# Patient Record
Sex: FEMALE | Race: WHITE | NOT HISPANIC OR LATINO
[De-identification: names, ages, dates, MRNs, and addresses within clinical notes are randomized per-mention and may not be internally consistent; named-entity substitution may affect disease eponyms.]

---

## 2017-01-05 ENCOUNTER — CHARTING TRANS (OUTPATIENT)
Dept: OTHER | Age: 72
End: 2017-01-05

## 2018-04-25 PROBLEM — K59.00 CONSTIPATION: Status: ACTIVE | Noted: 2017-09-11

## 2018-04-25 PROBLEM — K27.9 PUD (PEPTIC ULCER DISEASE): Status: ACTIVE | Noted: 2017-09-11

## 2018-04-25 PROBLEM — R10.12 LUQ ABDOMINAL PAIN: Status: ACTIVE | Noted: 2017-12-11

## 2018-04-25 PROBLEM — H25.811 COMBINED FORMS OF AGE-RELATED CATARACT, RIGHT EYE: Status: ACTIVE | Noted: 2017-06-19

## 2018-04-25 PROBLEM — K85.90 ACUTE PANCREATITIS (HCC): Status: ACTIVE | Noted: 2017-09-11

## 2018-04-25 PROBLEM — R07.89 ATYPICAL CHEST PAIN: Status: ACTIVE | Noted: 2017-09-11

## 2018-04-25 PROBLEM — K85.90 ACUTE PANCREATITIS: Status: ACTIVE | Noted: 2017-09-11

## 2018-04-25 PROBLEM — R10.13 EPIGASTRIC ABDOMINAL PAIN: Status: ACTIVE | Noted: 2017-09-11

## 2018-04-25 PROBLEM — H04.123 DRY EYE SYNDROME OF BILATERAL LACRIMAL GLANDS: Status: ACTIVE | Noted: 2017-06-19

## 2018-04-25 PROBLEM — K29.70 HELICOBACTER POSITIVE GASTRITIS: Status: ACTIVE | Noted: 2017-09-11

## 2018-04-25 PROBLEM — H25.10 NUCLEAR SENILE CATARACT: Status: ACTIVE | Noted: 2017-06-19

## 2018-04-25 PROBLEM — S05.00XA ABRASION OF CONJUNCTIVA: Status: ACTIVE | Noted: 2017-01-31

## 2018-04-25 PROBLEM — B96.81 HELICOBACTER POSITIVE GASTRITIS: Status: ACTIVE | Noted: 2017-09-11

## 2018-09-25 PROBLEM — H18.529 ABMD (ANTERIOR BASEMENT MEMBRANE DYSTROPHY): Status: ACTIVE | Noted: 2018-09-25

## 2018-09-25 PROBLEM — H25.13 NUCLEAR SCLEROTIC CATARACT OF BOTH EYES: Status: ACTIVE | Noted: 2018-09-25

## 2018-11-05 VITALS
HEART RATE: 78 BPM | HEIGHT: 60 IN | BODY MASS INDEX: 26.5 KG/M2 | OXYGEN SATURATION: 100 % | DIASTOLIC BLOOD PRESSURE: 82 MMHG | WEIGHT: 134.99 LBS | SYSTOLIC BLOOD PRESSURE: 152 MMHG

## 2019-10-03 PROBLEM — H43.812 POSTERIOR VITREOUS DETACHMENT OF LEFT EYE: Status: ACTIVE | Noted: 2019-10-03

## 2021-09-17 PROBLEM — H91.90 DECREASED HEARING: Status: ACTIVE | Noted: 2021-09-17

## 2021-09-17 PROBLEM — H90.2 CONDUCTIVE HEARING LOSS: Status: ACTIVE | Noted: 2021-09-17

## 2021-09-17 PROBLEM — E05.00 GRAVES' DISEASE: Status: ACTIVE | Noted: 2021-09-17

## 2021-09-17 PROBLEM — R09.89 CAROTID BRUIT: Status: ACTIVE | Noted: 2021-09-17

## 2021-09-17 PROBLEM — M54.9 BACKACHE: Status: ACTIVE | Noted: 2021-09-17

## 2021-09-17 PROBLEM — M54.12 CERVICAL RADICULOPATHY: Status: ACTIVE | Noted: 2021-09-17

## 2021-09-17 PROBLEM — M81.0 OSTEOPOROSIS: Status: ACTIVE | Noted: 2021-09-17

## 2021-09-17 PROBLEM — L30.9 DERMATITIS: Status: ACTIVE | Noted: 2021-09-17

## 2021-09-17 PROBLEM — R42 VERTIGO: Status: ACTIVE | Noted: 2021-09-17

## 2021-09-17 PROBLEM — M79.2 NEURALGIA: Status: ACTIVE | Noted: 2021-09-17

## 2021-09-17 PROBLEM — R63.8 INCREASED BODY MASS INDEX: Status: ACTIVE | Noted: 2018-09-20

## 2021-09-17 PROBLEM — M19.90 ARTHRITIS: Status: ACTIVE | Noted: 2021-09-17

## 2021-09-17 PROBLEM — M54.9 CHRONIC BACK PAIN: Status: ACTIVE | Noted: 2021-09-17

## 2021-09-17 PROBLEM — M41.9 ACQUIRED SCOLIOSIS: Status: ACTIVE | Noted: 2021-09-17

## 2021-09-17 PROBLEM — J32.9 SINUSITIS: Status: ACTIVE | Noted: 2021-09-17

## 2021-09-17 PROBLEM — M54.50 LOW BACK PAIN: Status: ACTIVE | Noted: 2021-09-17

## 2021-09-17 PROBLEM — M35.00 SJOGREN'S SYNDROME (HCC): Status: ACTIVE | Noted: 2018-03-12

## 2021-09-17 PROBLEM — IMO0002 EXCESS OR DEFICIENCY OF VITAMIN D: Status: ACTIVE | Noted: 2021-09-17

## 2021-09-17 PROBLEM — M85.80 OSTEOPENIA: Status: ACTIVE | Noted: 2021-09-17

## 2021-09-17 PROBLEM — H61.90 LESION OF EXTERNAL EAR: Status: ACTIVE | Noted: 2021-09-17

## 2021-09-17 PROBLEM — E03.9 HYPOTHYROIDISM: Status: ACTIVE | Noted: 2021-09-17

## 2021-09-17 PROBLEM — M48.00 SPINAL STENOSIS: Status: ACTIVE | Noted: 2021-09-17

## 2021-09-17 PROBLEM — M54.16 LUMBAR RADICULOPATHY: Status: ACTIVE | Noted: 2021-09-17

## 2021-09-17 PROBLEM — E78.5 HYPERLIPIDEMIA: Status: ACTIVE | Noted: 2021-09-17

## 2021-09-17 PROBLEM — M25.559 HIP PAIN: Status: ACTIVE | Noted: 2021-09-17

## 2021-09-17 PROBLEM — J06.9 UPPER RESPIRATORY INFECTION: Status: ACTIVE | Noted: 2021-09-17

## 2021-09-17 PROBLEM — E89.0 POSTABLATIVE HYPOTHYROIDISM: Status: ACTIVE | Noted: 2021-09-17

## 2021-09-17 PROBLEM — IMO0002 MASS: Status: ACTIVE | Noted: 2021-09-17

## 2021-09-17 PROBLEM — G89.29 CHRONIC BACK PAIN: Status: ACTIVE | Noted: 2021-09-17

## 2024-03-21 PROBLEM — J06.9 UPPER RESPIRATORY INFECTION: Status: RESOLVED | Noted: 2021-09-17 | Resolved: 2024-03-21

## 2024-08-29 ENCOUNTER — OFFICE VISIT (OUTPATIENT)
Dept: OTOLARYNGOLOGY | Facility: CLINIC | Age: 79
End: 2024-08-29

## 2024-08-29 DIAGNOSIS — M26.609 TMJ (TEMPOROMANDIBULAR JOINT DISORDER): ICD-10-CM

## 2024-08-29 DIAGNOSIS — H61.23 BILATERAL IMPACTED CERUMEN: Primary | ICD-10-CM

## 2024-08-29 DIAGNOSIS — H92.02 LEFT EAR PAIN: ICD-10-CM

## 2024-08-29 DIAGNOSIS — M35.00 SJOGREN'S SYNDROME, WITH UNSPECIFIED ORGAN INVOLVEMENT (HCC): ICD-10-CM

## 2024-08-29 PROCEDURE — 1159F MED LIST DOCD IN RCRD: CPT | Performed by: STUDENT IN AN ORGANIZED HEALTH CARE EDUCATION/TRAINING PROGRAM

## 2024-08-29 PROCEDURE — 99203 OFFICE O/P NEW LOW 30 MIN: CPT | Performed by: STUDENT IN AN ORGANIZED HEALTH CARE EDUCATION/TRAINING PROGRAM

## 2024-08-29 PROCEDURE — 1160F RVW MEDS BY RX/DR IN RCRD: CPT | Performed by: STUDENT IN AN ORGANIZED HEALTH CARE EDUCATION/TRAINING PROGRAM

## 2024-08-29 PROCEDURE — 69210 REMOVE IMPACTED EAR WAX UNI: CPT | Performed by: STUDENT IN AN ORGANIZED HEALTH CARE EDUCATION/TRAINING PROGRAM

## 2024-08-29 RX ORDER — TRAZODONE HYDROCHLORIDE 100 MG/1
100 TABLET ORAL NIGHTLY
COMMUNITY
Start: 2024-04-21

## 2024-08-29 RX ORDER — ROSUVASTATIN CALCIUM 20 MG/1
1 TABLET, COATED ORAL DAILY
COMMUNITY
Start: 2024-04-29

## 2024-08-29 NOTE — PROGRESS NOTES
Shwetha Pedroza is a 79 year old female.   Chief Complaint   Patient presents with    Ear Problem     Sharp pain and fuzzy feeling in left ear  Pt reports pain just started in right ear     HPI:   79-year-old with left ear pain.  Has a history of Sjogren's syndrome.  Also reports the pain recently started in her right ear    Current Outpatient Medications   Medication Sig Dispense Refill    traZODone 100 MG Oral Tab Take 1 tablet (100 mg total) by mouth nightly. TAKE AT BEDTIME      rosuvastatin 20 MG Oral Tab Take 1 tablet (20 mg total) by mouth daily.      denosumab (PROLIA) 60 MG/ML Subcutaneous Solution 1 mL.      Carboxymethylcellulose Sodium (REFRESH OP) Apply to eye.      aspirin 81 MG Oral Tab EC daily.      famotidine 40 MG Oral Tab Take by mouth daily.      Levothyroxine Sodium 100 MCG Oral Tab daily.      Esomeprazole Magnesium 40 MG Oral Capsule Delayed Release Take by mouth daily.      Calcium Carbonate-Vitamin D 500-200 MG-UNIT Oral Tab Take by mouth daily.        Past Medical History:    ABMD (anterior basement membrane dystrophy)    Age-related nuclear cataract, left eye    Bilateral dry eyes    Combined form of age-related cataract, right eye    Meibomianitis    Osteoporosis    Pancreatitis (HCC)    Puckering of macula, right eye    Thyroid disease    Vitreous degeneration, left eye      Social History:  Social History     Socioeconomic History    Marital status: Unknown   Tobacco Use    Smoking status: Never    Smokeless tobacco: Never   Substance and Sexual Activity    Alcohol use: No    Drug use: No      Past Surgical History:   Procedure Laterality Date    Cholecystectomy  2007    Colonoscopy  09/24/2010    Egd  10/05/2017    Egd  02/2018         EXAM:   There were no vitals taken for this visit.    System Details   Skin Inspection - Normal.   Constitutional Overall appearance - Normal.   Head/Face Symmetric, TMJ tenderness not present   Tender palpable left small lump near the region of her left  parotid gland   Eyes EOMI, PERRL   Right ear:  Canal clear, TM intact, no HA   Left ear:  Canal clear, TM intact, no HA   Nose: Septum midline, inferior turbinates not enlarged, nasal valves without collapse    Oral cavity/Oropharynx: No lesions or masses on inspection or palpation, tonsils symmetric    Neck: Soft without LAD, thyroid not enlarged  Voice clear/ no stridor   Other:      SCOPES AND PROCEDURES:     Canals:  Left: Canal with cerumen preventing adequate view of TM, debrided with instrumentation  Right: Canal with cerumen preventing adequate view of TM, debrided with instrumentation    Tympanic Membranes:  Left: Normal tympanic membrane.   Right: Normal tympanic membrane.     TM Visualized Method:   Left TM examined via otomicroscopy.    Right TM examined via otomicroscopy.      PROCEDURE:   Removal of cerumen impaction   The cerumen impaction was completely removed on the left and right sides using microscopy as necessary.   Removal was completed by using a curette and suction.     AUDIOGRAM AND IMAGING:         IMPRESSION:   1. Bilateral impacted cerumen    2. TMJ (temporomandibular joint disorder)    3. Left ear pain    4. Sjogren's syndrome, with unspecified organ involvement (HCC)       Recommendations:  -Normal ear exam besides cerumen.  -History of TMD and Sjogren syndrome with a tender palpable lump of her left parotid gland.  She states she had an MRI of this and has been managed for Sjogren's by her primary care provider.  Was told the MRI findings of her parotid gland were not concerning.  I do not see reports  -Discussed that a TMD flare or a Sjogren's flare in her left parotid gland can possibly cause the otalgia  -She will try over-the-counter ibuprofen and discussed possibly seeing a rheumatologist with her primary care provider    The patient indicates understanding of these issues and agrees to the plan.      He Narayanan MD  8/29/2024  4:46 PM